# Patient Record
Sex: FEMALE | Race: WHITE | NOT HISPANIC OR LATINO | URBAN - METROPOLITAN AREA
[De-identification: names, ages, dates, MRNs, and addresses within clinical notes are randomized per-mention and may not be internally consistent; named-entity substitution may affect disease eponyms.]

---

## 2017-08-04 PROBLEM — M51.369 DDD (DEGENERATIVE DISC DISEASE), LUMBAR: Status: ACTIVE | Noted: 2017-08-04

## 2023-04-06 ENCOUNTER — APPOINTMENT (OUTPATIENT)
Dept: MRI IMAGING | Facility: HOSPITAL | Age: 58
End: 2023-04-06
Payer: COMMERCIAL

## 2023-04-06 ENCOUNTER — HOSPITAL ENCOUNTER (OUTPATIENT)
Facility: HOSPITAL | Age: 58
Setting detail: OBSERVATION
Discharge: HOME | End: 2023-04-07
Attending: EMERGENCY MEDICINE | Admitting: HOSPITALIST
Payer: COMMERCIAL

## 2023-04-06 ENCOUNTER — APPOINTMENT (OUTPATIENT)
Dept: CT IMAGING | Facility: HOSPITAL | Age: 58
End: 2023-04-06
Payer: COMMERCIAL

## 2023-04-06 DIAGNOSIS — R10.13 EPIGASTRIC PAIN: Primary | ICD-10-CM

## 2023-04-06 DIAGNOSIS — K83.8 COMMON BILE DUCT DILATION: ICD-10-CM

## 2023-04-06 LAB
ALBUMIN SERPL-MCNC: 4.3 G/DL (ref 3.5–5.2)
ALBUMIN/GLOB SERPL: 1.6 G/DL
ALP SERPL-CCNC: 88 U/L (ref 39–117)
ALT SERPL W P-5'-P-CCNC: 9 U/L (ref 1–33)
ANION GAP SERPL CALCULATED.3IONS-SCNC: 12.1 MMOL/L (ref 5–15)
AST SERPL-CCNC: 23 U/L (ref 1–32)
BASOPHILS # BLD AUTO: 0.05 10*3/MM3 (ref 0–0.2)
BASOPHILS NFR BLD AUTO: 0.4 % (ref 0–1.5)
BILIRUB SERPL-MCNC: 0.4 MG/DL (ref 0–1.2)
BILIRUB UR QL STRIP: NEGATIVE
BUN SERPL-MCNC: 16 MG/DL (ref 6–20)
BUN/CREAT SERPL: 21.3 (ref 7–25)
CALCIUM SPEC-SCNC: 9.4 MG/DL (ref 8.6–10.5)
CHLORIDE SERPL-SCNC: 105 MMOL/L (ref 98–107)
CLARITY UR: CLEAR
CO2 SERPL-SCNC: 23.9 MMOL/L (ref 22–29)
COLOR UR: YELLOW
CREAT SERPL-MCNC: 0.75 MG/DL (ref 0.57–1)
DEPRECATED RDW RBC AUTO: 41.3 FL (ref 37–54)
EGFRCR SERPLBLD CKD-EPI 2021: 91.8 ML/MIN/1.73
EOSINOPHIL # BLD AUTO: 0.43 10*3/MM3 (ref 0–0.4)
EOSINOPHIL NFR BLD AUTO: 3.6 % (ref 0.3–6.2)
ERYTHROCYTE [DISTWIDTH] IN BLOOD BY AUTOMATED COUNT: 12.5 % (ref 12.3–15.4)
GEN 5 2HR TROPONIN T REFLEX: <6 NG/L
GLOBULIN UR ELPH-MCNC: 2.7 GM/DL
GLUCOSE SERPL-MCNC: 93 MG/DL (ref 65–99)
GLUCOSE UR STRIP-MCNC: NEGATIVE MG/DL
HCT VFR BLD AUTO: 40.9 % (ref 34–46.6)
HGB BLD-MCNC: 13.9 G/DL (ref 12–15.9)
HGB UR QL STRIP.AUTO: NEGATIVE
HOLD SPECIMEN: NORMAL
HOLD SPECIMEN: NORMAL
IMM GRANULOCYTES # BLD AUTO: 0.04 10*3/MM3 (ref 0–0.05)
IMM GRANULOCYTES NFR BLD AUTO: 0.3 % (ref 0–0.5)
KETONES UR QL STRIP: NEGATIVE
LEUKOCYTE ESTERASE UR QL STRIP.AUTO: NEGATIVE
LIPASE SERPL-CCNC: 49 U/L (ref 13–60)
LYMPHOCYTES # BLD AUTO: 1.77 10*3/MM3 (ref 0.7–3.1)
LYMPHOCYTES NFR BLD AUTO: 15 % (ref 19.6–45.3)
MCH RBC QN AUTO: 30.5 PG (ref 26.6–33)
MCHC RBC AUTO-ENTMCNC: 34 G/DL (ref 31.5–35.7)
MCV RBC AUTO: 89.9 FL (ref 79–97)
MONOCYTES # BLD AUTO: 0.83 10*3/MM3 (ref 0.1–0.9)
MONOCYTES NFR BLD AUTO: 7 % (ref 5–12)
NEUTROPHILS NFR BLD AUTO: 73.7 % (ref 42.7–76)
NEUTROPHILS NFR BLD AUTO: 8.67 10*3/MM3 (ref 1.7–7)
NITRITE UR QL STRIP: NEGATIVE
NRBC BLD AUTO-RTO: 0 /100 WBC (ref 0–0.2)
PH UR STRIP.AUTO: <=5 [PH] (ref 5–8)
PLATELET # BLD AUTO: 344 10*3/MM3 (ref 140–450)
PMV BLD AUTO: 9.8 FL (ref 6–12)
POTASSIUM SERPL-SCNC: 5 MMOL/L (ref 3.5–5.2)
PROT SERPL-MCNC: 7 G/DL (ref 6–8.5)
PROT UR QL STRIP: NEGATIVE
QT INTERVAL: 407 MS
RBC # BLD AUTO: 4.55 10*6/MM3 (ref 3.77–5.28)
SODIUM SERPL-SCNC: 141 MMOL/L (ref 136–145)
SP GR UR STRIP: 1.02 (ref 1–1.03)
TROPONIN T DELTA: NORMAL
TROPONIN T SERPL HS-MCNC: 14 NG/L
UROBILINOGEN UR QL STRIP: NORMAL
WBC NRBC COR # BLD: 11.79 10*3/MM3 (ref 3.4–10.8)
WHOLE BLOOD HOLD COAG: NORMAL
WHOLE BLOOD HOLD SPECIMEN: NORMAL

## 2023-04-06 PROCEDURE — 36415 COLL VENOUS BLD VENIPUNCTURE: CPT

## 2023-04-06 PROCEDURE — 81003 URINALYSIS AUTO W/O SCOPE: CPT | Performed by: NURSE PRACTITIONER

## 2023-04-06 PROCEDURE — G0378 HOSPITAL OBSERVATION PER HR: HCPCS

## 2023-04-06 PROCEDURE — 84484 ASSAY OF TROPONIN QUANT: CPT | Performed by: NURSE PRACTITIONER

## 2023-04-06 PROCEDURE — 93005 ELECTROCARDIOGRAM TRACING: CPT | Performed by: NURSE PRACTITIONER

## 2023-04-06 PROCEDURE — 80053 COMPREHEN METABOLIC PANEL: CPT | Performed by: NURSE PRACTITIONER

## 2023-04-06 PROCEDURE — 25510000001 IOPAMIDOL 61 % SOLUTION: Performed by: EMERGENCY MEDICINE

## 2023-04-06 PROCEDURE — 85025 COMPLETE CBC W/AUTO DIFF WBC: CPT | Performed by: NURSE PRACTITIONER

## 2023-04-06 PROCEDURE — A9577 INJ MULTIHANCE: HCPCS | Performed by: INTERNAL MEDICINE

## 2023-04-06 PROCEDURE — 96374 THER/PROPH/DIAG INJ IV PUSH: CPT

## 2023-04-06 PROCEDURE — 74177 CT ABD & PELVIS W/CONTRAST: CPT

## 2023-04-06 PROCEDURE — 83690 ASSAY OF LIPASE: CPT | Performed by: NURSE PRACTITIONER

## 2023-04-06 PROCEDURE — 93010 ELECTROCARDIOGRAM REPORT: CPT | Performed by: INTERNAL MEDICINE

## 2023-04-06 PROCEDURE — 74183 MRI ABD W/O CNTR FLWD CNTR: CPT

## 2023-04-06 PROCEDURE — 96375 TX/PRO/DX INJ NEW DRUG ADDON: CPT

## 2023-04-06 PROCEDURE — 0 GADOBENATE DIMEGLUMINE 529 MG/ML SOLUTION: Performed by: INTERNAL MEDICINE

## 2023-04-06 PROCEDURE — 25010000002 ONDANSETRON PER 1 MG: Performed by: NURSE PRACTITIONER

## 2023-04-06 PROCEDURE — 99285 EMERGENCY DEPT VISIT HI MDM: CPT

## 2023-04-06 PROCEDURE — 96361 HYDRATE IV INFUSION ADD-ON: CPT

## 2023-04-06 PROCEDURE — 25010000002 MORPHINE PER 10 MG: Performed by: NURSE PRACTITIONER

## 2023-04-06 PROCEDURE — 25010000002 HYDROMORPHONE 1 MG/ML SOLUTION: Performed by: NURSE PRACTITIONER

## 2023-04-06 RX ORDER — UREA 10 %
3 LOTION (ML) TOPICAL NIGHTLY PRN
Status: DISCONTINUED | OUTPATIENT
Start: 2023-04-06 | End: 2023-04-07 | Stop reason: HOSPADM

## 2023-04-06 RX ORDER — ONDANSETRON 2 MG/ML
4 INJECTION INTRAMUSCULAR; INTRAVENOUS EVERY 6 HOURS PRN
Status: DISCONTINUED | OUTPATIENT
Start: 2023-04-06 | End: 2023-04-07 | Stop reason: HOSPADM

## 2023-04-06 RX ORDER — MORPHINE SULFATE 2 MG/ML
4 INJECTION, SOLUTION INTRAMUSCULAR; INTRAVENOUS ONCE
Status: DISCONTINUED | OUTPATIENT
Start: 2023-04-06 | End: 2023-04-06

## 2023-04-06 RX ORDER — SODIUM CHLORIDE 9 MG/ML
75 INJECTION, SOLUTION INTRAVENOUS CONTINUOUS
Status: DISCONTINUED | OUTPATIENT
Start: 2023-04-06 | End: 2023-04-07 | Stop reason: HOSPADM

## 2023-04-06 RX ORDER — CHOLECALCIFEROL (VITAMIN D3) 125 MCG
1000 CAPSULE ORAL DAILY
Status: DISCONTINUED | OUTPATIENT
Start: 2023-04-07 | End: 2023-04-07 | Stop reason: HOSPADM

## 2023-04-06 RX ORDER — PANTOPRAZOLE SODIUM 40 MG/10ML
80 INJECTION, POWDER, LYOPHILIZED, FOR SOLUTION INTRAVENOUS ONCE
Status: COMPLETED | OUTPATIENT
Start: 2023-04-06 | End: 2023-04-06

## 2023-04-06 RX ORDER — ACETAMINOPHEN 325 MG/1
650 TABLET ORAL EVERY 4 HOURS PRN
Status: DISCONTINUED | OUTPATIENT
Start: 2023-04-06 | End: 2023-04-07 | Stop reason: HOSPADM

## 2023-04-06 RX ORDER — MORPHINE SULFATE 2 MG/ML
4 INJECTION, SOLUTION INTRAMUSCULAR; INTRAVENOUS ONCE
Status: COMPLETED | OUTPATIENT
Start: 2023-04-06 | End: 2023-04-06

## 2023-04-06 RX ORDER — ONDANSETRON 4 MG/1
4 TABLET, FILM COATED ORAL EVERY 6 HOURS PRN
Status: DISCONTINUED | OUTPATIENT
Start: 2023-04-06 | End: 2023-04-07 | Stop reason: HOSPADM

## 2023-04-06 RX ORDER — MELATONIN
2000 DAILY
Status: DISCONTINUED | OUTPATIENT
Start: 2023-04-07 | End: 2023-04-07 | Stop reason: HOSPADM

## 2023-04-06 RX ORDER — SODIUM CHLORIDE 0.9 % (FLUSH) 0.9 %
10 SYRINGE (ML) INJECTION AS NEEDED
Status: DISCONTINUED | OUTPATIENT
Start: 2023-04-06 | End: 2023-04-07 | Stop reason: HOSPADM

## 2023-04-06 RX ORDER — MORPHINE SULFATE 2 MG/ML
4 INJECTION, SOLUTION INTRAMUSCULAR; INTRAVENOUS EVERY 4 HOURS PRN
Status: DISCONTINUED | OUTPATIENT
Start: 2023-04-06 | End: 2023-04-07 | Stop reason: HOSPADM

## 2023-04-06 RX ORDER — ONDANSETRON 2 MG/ML
4 INJECTION INTRAMUSCULAR; INTRAVENOUS ONCE
Status: COMPLETED | OUTPATIENT
Start: 2023-04-06 | End: 2023-04-06

## 2023-04-06 RX ADMIN — ACETAMINOPHEN 650 MG: 325 TABLET ORAL at 23:23

## 2023-04-06 RX ADMIN — MORPHINE SULFATE 4 MG: 2 INJECTION, SOLUTION INTRAMUSCULAR; INTRAVENOUS at 12:56

## 2023-04-06 RX ADMIN — PANTOPRAZOLE SODIUM 80 MG: 40 INJECTION, POWDER, FOR SOLUTION INTRAVENOUS at 12:56

## 2023-04-06 RX ADMIN — ONDANSETRON 4 MG: 2 INJECTION INTRAMUSCULAR; INTRAVENOUS at 12:55

## 2023-04-06 RX ADMIN — IOPAMIDOL 85 ML: 612 INJECTION, SOLUTION INTRAVENOUS at 15:38

## 2023-04-06 RX ADMIN — SODIUM CHLORIDE 75 ML/HR: 9 INJECTION, SOLUTION INTRAVENOUS at 21:18

## 2023-04-06 RX ADMIN — HYDROMORPHONE HYDROCHLORIDE 1 MG: 1 INJECTION, SOLUTION INTRAMUSCULAR; INTRAVENOUS; SUBCUTANEOUS at 16:32

## 2023-04-06 RX ADMIN — GADOBENATE DIMEGLUMINE 16 ML: 529 INJECTION, SOLUTION INTRAVENOUS at 22:46

## 2023-04-06 NOTE — ED NOTES
"Nursing report ED to floor  Hoda Hayes  57 y.o.  female    HPI :   Chief Complaint   Patient presents with    Abdominal Pain       Admitting doctor:   Kathleen Freitas MD    Admitting diagnosis:   The primary encounter diagnosis was Epigastric pain. A diagnosis of Common bile duct dilation was also pertinent to this visit.    Code status:   Current Code Status       Date Active Code Status Order ID Comments User Context       Not on file            Allergies:   Patient has no known allergies.    Isolation:   No active isolations    Intake and Output  No intake or output data in the 24 hours ending 04/06/23 1641    Weight:       04/06/23  1245   Weight: 71.7 kg (158 lb)       Most recent vitals:   Vitals:    04/06/23 1233 04/06/23 1234 04/06/23 1244 04/06/23 1245   BP: 120/82      Pulse:   87    Resp:       Temp:       TempSrc:       SpO2:   100%    Weight:    71.7 kg (158 lb)   Height:  170.2 cm (67.01\")         Active LDAs/IV Access:   Lines, Drains & Airways       Active LDAs       Name Placement date Placement time Site Days    Peripheral IV 04/06/23 1247 Right Antecubital 04/06/23  1247  Antecubital  less than 1                    Labs (abnormal labs have a star):   Labs Reviewed   CBC WITH AUTO DIFFERENTIAL - Abnormal; Notable for the following components:       Result Value    WBC 11.79 (*)     Lymphocyte % 15.0 (*)     Neutrophils, Absolute 8.67 (*)     Eosinophils, Absolute 0.43 (*)     All other components within normal limits   TROPONIN - Abnormal; Notable for the following components:    HS Troponin T 14 (*)     All other components within normal limits    Narrative:     High Sensitive Troponin T Reference Range:  <10.0 ng/L- Negative Female for AMI  <15.0 ng/L- Negative Male for AMI  >=10 - Abnormal Female indicating possible myocardial injury.  >=15 - Abnormal Male indicating possible myocardial injury.   Clinicians would have to utilize clinical acumen, EKG, Troponin, and serial changes " to determine if it is an Acute Myocardial Infarction or myocardial injury due to an underlying chronic condition.        LIPASE - Normal   URINALYSIS W/ MICROSCOPIC IF INDICATED (NO CULTURE) - Normal    Narrative:     Urine microscopic not indicated.   RAINBOW DRAW    Narrative:     The following orders were created for panel order [place] Draw.  Procedure                               Abnormality         Status                     ---------                               -----------         ------                     Green Top (Gel)[[id number]]                                   Final result               Lavender Top[[id number]]                                      Final result               Gold Top - SST[[id number]]                                    Final result               Light Blue Top[[id number]]                                   Final result                 Please view results for these tests on the individual orders.   COMPREHENSIVE METABOLIC PANEL    Narrative:     GFR Normal >60  Chronic Kidney Disease <60  Kidney Failure <15     HIGH SENSITIVITIY TROPONIN T 2HR    Narrative:     High Sensitive Troponin T Reference Range:  <10.0 ng/L- Negative Female for AMI  <15.0 ng/L- Negative Male for AMI  >=10 - Abnormal Female indicating possible myocardial injury.  >=15 - Abnormal Male indicating possible myocardial injury.   Clinicians would have to utilize clinical acumen, EKG, Troponin, and serial changes to determine if it is an Acute Myocardial Infarction or myocardial injury due to an underlying chronic condition.        CBC AND DIFFERENTIAL    Narrative:     The following orders were created for panel order CBC & Differential.  Procedure                               Abnormality         Status                     ---------                               -----------         ------                     CBC Auto Differential[[id number]]        Abnormal            Final result                 Please view results for  these tests on the individual orders.   GREEN TOP   LAVENDER TOP   GOLD TOP - SST   LIGHT BLUE TOP       EKG:   ECG 12 Lead Chest Pain   Final Result   HEART RATE= 78  bpm   RR Interval= 769  ms   AZ Interval= 143  ms   P Horizontal Axis= 10  deg   P Front Axis= 60  deg   QRSD Interval= 98  ms   QT Interval= 407  ms   QRS Axis= 25  deg   T Wave Axis= 42  deg   - BORDERLINE ECG -   Sinus rhythm   Borderline ST elevation, anterior leads   No Prior Tracing for Comparison   Electronically Signed By: Germaine Hunter ([facility]) 2023 16:00:09   Date and Time of Study: 2023 12:57:33          Meds given in ED:   Medications   sodium chloride 0.9 % flush 10 mL (has no administration in time range)   pantoprazole (PROTONIX) injection 80 mg (80 mg Intravenous Given 23 1256)   morphine injection 4 mg (4 mg Intravenous Given 23 1256)   ondansetron (ZOFRAN) injection 4 mg (4 mg Intravenous Given 23 1255)   iopamidol (ISOVUE-300) 61 % injection 85 mL (85 mL Intravenous Given 23 1538)   HYDROmorphone (DILAUDID) injection 1 mg (1 mg Intravenous Given 23 1632)       Imaging results:  CT Abdomen Pelvis With Contrast    Result Date: 2023   1.  Status post cholecystectomy with central intrahepatic biliary ductal prominence and common bile duct dilatation, which tapers distally and is likely postoperative. However, the pancreatic duct is also prominent. Further evaluation contrast-enhanced MRCP is recommended to exclude an underlying lesion. 2.  Colonic diverticulosis.  Discussed with NAFISA Thompson at 4:06 PM.  This report was finalized on 2023 4:06 PM by Dr. Darlene Bains M.D.       Ambulatory status:   Partial assist    Social issues:   Social History     Socioeconomic History    Marital status:    Tobacco Use    Smoking status: Former     Packs/day: 0.50     Years: 12.00     Pack years: 6.00     Types: Cigarettes     Quit date: 10/21/2016     Years since quittin.4    Smokeless  tobacco: Never   Substance and Sexual Activity    Alcohol use: Yes     Comment: OCCASIONAL    Drug use: No    Sexual activity: Yes     Partners: Male       NIH Stroke Scale:         Sweetie Campa RN  04/06/23 16:41 EDT

## 2023-04-06 NOTE — PLAN OF CARE
Goal Outcome Evaluation:              Outcome Evaluation: admit from ER. GI consulted. Admission orders placed.

## 2023-04-06 NOTE — ED PROVIDER NOTES
MD ATTESTATION NOTE    The PEEWEE and I have discussed this patient's history, physical exam, and treatment plan.  I have reviewed the documentation and personally had a face to face interaction with the patient. I affirm the documentation and agree with the treatment and plan.  The attached note describes my personal findings.    I provided a substantive portion of the care of this patient. I personally performed the physical exam, in its entirety.    History  57-year-old female presents with epigastric pain onset this morning.    Physical Exam  Vital Signs reviewed  GENERAL: Alert female in mild distress.  Triage vitals reviewed and are fairly benign  HENT: nares patent  EYES: no scleral icterus  CV: regular rhythm, regular rate-no murmur  RESPIRATORY: normal effort, clear to auscultation bilaterally  ABDOMEN: soft, mild epigastric tenderness without rebound or guarding  MUSCULOSKELETAL: no deformity  NEURO: Strength sensation and coordination are grossly intact.  Speech and mentation are unremarkable  SKIN: warm, dry    EKG independently interpreted by me    Time 12:57 PM  Sinus 78  P waves-normal P waves and NV intervals  QRS-normal axis, normal QRS  ST, T wave-unremarkable    No prior to compare      Disposition  I discussed treatment and evaluation this patient with NP Giselle Jaquez.  Patient with epigastric tenderness of unclear etiology.  EKG reassuring and sounds atypical for cardiac.  Suspect this may be more of a gastric problem, gastritis favored is likely cause.  Serum lipase is normal which would go against pancreatitis.  Gallbladder is already surgically absent.  We will get CT scan of the abdomen for further assessment.  Disposition pending results of further testing.       Jose Lombardi MD  04/06/23 1076

## 2023-04-06 NOTE — ED PROVIDER NOTES
EMERGENCY DEPARTMENT ENCOUNTER    Room Number:    Date seen:  2023  PCP: Provider, No Known  Historian: patient      HPI:  Chief Complaint: abdominal pain  A complete HPI/ROS/PMH/PSH/SH/FH are unobtainable due to: nothing  Context: Hoda Hayes is a pleasant afebrile ambulatory 57 y.o.  female who presents to the ED c/o epigastric abdominal pain      She describes it as epigastric.  States it is waxing and waning in nature.  Nothing improves it nothing worsens it.  States she has never had this pain previously.  She denies daily alcohol use.  She denies any melena.      PAST MEDICAL HISTORY  Active Ambulatory Problems     Diagnosis Date Noted   • DDD (degenerative disc disease), lumbar 2017     Resolved Ambulatory Problems     Diagnosis Date Noted   • No Resolved Ambulatory Problems     Past Medical History:   Diagnosis Date   • Abdominal pain    • Bulging lumbar disc    • Dizziness of unknown cause    • History of anemia    • Low back pain    • Migraine    • Neck pain          PAST SURGICAL HISTORY  Past Surgical History:   Procedure Laterality Date   • BREAST SURGERY      AUGMENTATION   • CHOLECYSTECTOMY N/A 10/21/2016    Procedure: CHOLECYSTECTOMY LAPAROSCOPIC;  Surgeon: Luis Topete MD;  Location: [facility];  Service:    • TONSILLECTOMY           FAMILY HISTORY  Family History   Problem Relation Age of Onset   • No Known Problems Mother    • No Known Problems Father          SOCIAL HISTORY  Social History     Socioeconomic History   • Marital status:    Tobacco Use   • Smoking status: Former     Packs/day: 0.50     Years: 12.00     Pack years: 6.00     Types: Cigarettes     Quit date: 10/21/2016     Years since quittin.4   • Smokeless tobacco: Never   Substance and Sexual Activity   • Alcohol use: Yes     Comment: OCCASIONAL   • Drug use: No   • Sexual activity: Yes     Partners: Male         ALLERGIES  Patient has no known allergies.        REVIEW OF  SYSTEMS  Review of Systems   Gastrointestinal: Positive for abdominal pain.   All other systems reviewed and are negative.           PHYSICAL EXAM  ED Triage Vitals   Temp Heart Rate Resp BP SpO2   04/06/23 1222 04/06/23 1244 04/06/23 1222 04/06/23 1233 04/06/23 1244   98 °F (36.7 °C) 87 18 120/82 100 %      Temp src Heart Rate Source Patient Position BP Location FiO2 (%)   04/06/23 1222 -- -- -- --   Tympanic           Physical Exam      GENERAL: Alert & oriented x 4, no acute distress  HENT: nares patent, mucus membranes dry and intact  EYES: no scleral icterus, no injection  CV: regular rhythm, normal rate, no murmurs or gallops, no peripheral edema  RESPIRATORY: normal effort, clear to all fields bilaterally, able to speak in full sentences without hint of distress  ABDOMEN: soft and nontender diffusely, bowel sounds WNL, no rebound or guarding  MUSCULOSKELETAL: no deformity, normal active range of motion of all extremities   NEURO: alert, moves all extremities, follows commands  PSYCH:  calm, cooperative  SKIN: warm, dry and intact      Vital signs and nursing notes reviewed.          LAB RESULTS  Recent Results (from the past 24 hour(s))   Lipase    Collection Time: 04/06/23 12:38 PM    Specimen: Blood   Result Value Ref Range    Lipase 49 13 - 60 U/L   Urinalysis With Microscopic If Indicated (No Culture) - Urine, Clean Catch    Collection Time: 04/06/23 12:38 PM    Specimen: Urine, Clean Catch   Result Value Ref Range    Color, UA Yellow Yellow, Straw    Appearance, UA Clear Clear    pH, UA <=5.0 5.0 - 8.0    Specific Gravity, UA 1.022 1.005 - 1.030    Glucose, UA Negative Negative    Ketones, UA Negative Negative    Bilirubin, UA Negative Negative    Blood, UA Negative Negative    Protein, UA Negative Negative    Leuk Esterase, UA Negative Negative    Nitrite, UA Negative Negative    Urobilinogen, UA 0.2 E.U./dL 0.2 - 1.0 E.U./dL   Green Top (Gel)    Collection Time: 04/06/23 12:38 PM   Result Value Ref  Range    Extra Tube Hold for add-ons.    Lavender Top    Collection Time: 04/06/23 12:38 PM   Result Value Ref Range    Extra Tube hold for add-on    Gold Top - SST    Collection Time: 04/06/23 12:38 PM   Result Value Ref Range    Extra Tube Hold for add-ons.    Light Blue Top    Collection Time: 04/06/23 12:38 PM   Result Value Ref Range    Extra Tube Hold for add-ons.    CBC Auto Differential    Collection Time: 04/06/23 12:38 PM    Specimen: Blood   Result Value Ref Range    WBC 11.79 (H) 3.40 - 10.80 10*3/mm3    RBC 4.55 3.77 - 5.28 10*6/mm3    Hemoglobin 13.9 12.0 - 15.9 g/dL    Hematocrit 40.9 34.0 - 46.6 %    MCV 89.9 79.0 - 97.0 fL    MCH 30.5 26.6 - 33.0 pg    MCHC 34.0 31.5 - 35.7 g/dL    RDW 12.5 12.3 - 15.4 %    RDW-SD 41.3 37.0 - 54.0 fl    MPV 9.8 6.0 - 12.0 fL    Platelets 344 140 - 450 10*3/mm3    Neutrophil % 73.7 42.7 - 76.0 %    Lymphocyte % 15.0 (L) 19.6 - 45.3 %    Monocyte % 7.0 5.0 - 12.0 %    Eosinophil % 3.6 0.3 - 6.2 %    Basophil % 0.4 0.0 - 1.5 %    Immature Grans % 0.3 0.0 - 0.5 %    Neutrophils, Absolute 8.67 (H) 1.70 - 7.00 10*3/mm3    Lymphocytes, Absolute 1.77 0.70 - 3.10 10*3/mm3    Monocytes, Absolute 0.83 0.10 - 0.90 10*3/mm3    Eosinophils, Absolute 0.43 (H) 0.00 - 0.40 10*3/mm3    Basophils, Absolute 0.05 0.00 - 0.20 10*3/mm3    Immature Grans, Absolute 0.04 0.00 - 0.05 10*3/mm3    nRBC 0.0 0.0 - 0.2 /100 WBC   ECG 12 Lead Chest Pain    Collection Time: 04/06/23 12:57 PM   Result Value Ref Range    QT Interval 407 ms   Comprehensive Metabolic Panel    Collection Time: 04/06/23  1:25 PM    Specimen: Blood   Result Value Ref Range    Glucose 93 65 - 99 mg/dL    BUN 16 6 - 20 mg/dL    Creatinine 0.75 0.57 - 1.00 mg/dL    Sodium 141 136 - 145 mmol/L    Potassium 5.0 3.5 - 5.2 mmol/L    Chloride 105 98 - 107 mmol/L    CO2 23.9 22.0 - 29.0 mmol/L    Calcium 9.4 8.6 - 10.5 mg/dL    Total Protein 7.0 6.0 - 8.5 g/dL    Albumin 4.3 3.5 - 5.2 g/dL    ALT (SGPT) 9 1 - 33 U/L    AST (SGOT)  23 1 - 32 U/L    Alkaline Phosphatase 88 39 - 117 U/L    Total Bilirubin 0.4 0.0 - 1.2 mg/dL    Globulin 2.7 gm/dL    A/G Ratio 1.6 g/dL    BUN/Creatinine Ratio 21.3 7.0 - 25.0    Anion Gap 12.1 5.0 - 15.0 mmol/L    eGFR 91.8 >60.0 mL/min/1.73   High Sensitivity Troponin T    Collection Time: 04/06/23  1:25 PM    Specimen: Blood   Result Value Ref Range    HS Troponin T 14 (H) <10 ng/L   High Sensitivity Troponin T 2Hr    Collection Time: 04/06/23  3:53 PM    Specimen: Arm, Right; Blood   Result Value Ref Range    HS Troponin T <6 <10 ng/L    Troponin T Delta         Ordered the above labs and reviewed the results.        RADIOLOGY  CT Abdomen Pelvis With Contrast    Result Date: 4/6/2023  CT ABDOMEN PELVIS W CONTRAST-  HISTORY:  Epigastric pain, taking NSAIDs.  TECHNIQUE:  CT of the abdomen and pelvis was performed following the administration of intravenous contrast.   Radiation dose reduction techniques were utilized, including automated exposure control and exposure modulation based on body size.  COMPARISON:  Gallbladder ultrasound 10/12/2016  FINDINGS:  Heart size is normal. There is no pericardial effusion. Lung bases and pleural spaces are clear. The liver is normal in size. A hypodense focus in the peripheral right hepatic lobe is too small to accurately characterize. There is central intrahepatic biliary ductal prominence.. The gallbladder is surgically absent. The common bile duct is dilated and slightly tapers distally. The spleen is normal in size. The proximal pancreatic duct is prominent. The adrenal glands are within normal limits. There is a 3.9 cm inferior right renal cyst. There is no hydronephrosis. No pathologically enlarged abdominal or pelvic lymph nodes are identified. The abdominal aorta is normal in caliber. There is no bowel obstruction. The appendix is within normal limits. There is colonic diverticulosis without CT evidence of acute diverticulitis. The bladder is mildly distended normal  limits. The uterus is present. There is no adnexal mass. There is a tiny fat-containing umbilical hernia. There are partially imaged bilateral breast implants. There is multilevel degenerative disc disease.        1.  Status post cholecystectomy with central intrahepatic biliary ductal prominence and common bile duct dilatation, which tapers distally and is likely postoperative. However, the pancreatic duct is also prominent. Further evaluation contrast-enhanced MRCP is recommended to exclude an underlying lesion. 2.  Colonic diverticulosis.  Discussed with NAFISA Thompson at 4:06 PM.  This report was finalized on 4/6/2023 4:06 PM by Dr. Darlene Bains M.D.        Ordered the above noted radiological studies. Reviewed by me in PACS.            PROCEDURES  Procedures          MEDICATIONS GIVEN IN ER  Medications   sodium chloride 0.9 % flush 10 mL (has no administration in time range)   pantoprazole (PROTONIX) injection 80 mg (80 mg Intravenous Given 4/6/23 1256)   morphine injection 4 mg (4 mg Intravenous Given 4/6/23 1256)   ondansetron (ZOFRAN) injection 4 mg (4 mg Intravenous Given 4/6/23 1255)                   MEDICAL DECISION MAKING, PROGRESS, and CONSULTS    All labs have been independently reviewed by me.  All radiology studies have been reviewed by me and I have also reviewed the radiology report.   EKG's independently viewed and interpreted by me.  Discussion below represents my analysis of pertinent findings related to patient's condition, differential diagnosis, treatment plan and final disposition.      Additional sources:  - Discussed/ obtained information from independent historians: Obtained from patient    - External (non-ED) record review: 11/30/2022 [facility], seen for right ear fullness by NAFISA Brito, prescribed Augmentin and methylprednisolone for rhinosinusitis    - Chronic or social conditions impacting care: None    - Shared decision making: Risk benefit of  CT abdomen pelvis discussed with patient and she is agreeable to proceed with advanced imaging despite radiation exposure      Orders placed during this visit:  Orders Placed This Encounter   Procedures   • CT Abdomen Pelvis With Contrast   • [place] Draw   • Comprehensive Metabolic Panel   • Lipase   • Urinalysis With Microscopic If Indicated (No Culture) - Urine, Clean Catch   • CBC Auto Differential   • High Sensitivity Troponin T   • ECG 12 Lead Chest Pain   • Insert Peripheral IV   • CBC & Differential   • Green Top (Gel)   • Lavender Top   • Gold Top - SST   • Light Blue Top         Additional orders considered but not ordered:  MRI considered but I feel this would be better done by inpatient team        Differential diagnosis includes but is not limited to:    Pancreatitis, duodenitis, gastric ulcer      Independent interpretation of labs, radiology studies, and discussions with consultants:  ED Course as of 04/06/23 1633   Thu Apr 06, 2023   1248 Pt c/o epigastric pain today. States she drank a few cups of coffee on an empty stomach like she does regularly ,has been taking claritin and advil as well lately usually on an empty stomach. No melena, last BM was today and normal. States she developed nausea and cramping today. States she had an endoscopy in [place] years ago but unsure what this showed. Had her cholecystectomy about 4 years ago [AH]   1408 WBC(!): 11.79 [AH]   1459 Hemoglobin: 13.9 [AH]   1459 Lipase: 49 [AH]   1543 HS Troponin T(!): 14 [AH]   1612 Phone call with [facility].  Discussed the patient, relevant history, exam, diagnostics, ED findings/progress, and concerns.  MD advises that patient has common bile duct dilatation and would likely benefit from an MRCP   [AH]   1632 Phone call with dr. jalloh.  Discussed the patient, relevant history, exam, diagnostics, ED findings/progress, and concerns.  []      ED Course User Index  [AH] Giselle Jaquez, APRN             I have worn  appropriate PPE during this patient encounter, sanitized my hands both with entering and exiting patient's room.      DIAGNOSIS  Final diagnoses:   Epigastric pain   Common bile duct dilation         DISPOSITION  Admitted to [facility] dr. jalloh            Latest Documented Vital Signs:  As of 14:59 EDT  BP- 120/82 HR- 87 Temp- 98 °F (36.7 °C) (Tympanic) O2 sat- 100%              --    Please note that portions of this were completed with a voice recognition program.       Note Disclaimer: At [facility], we believe that sharing information builds trust and better relationships. You are receiving this note because you are receiving care at [facility] or recently visited. It is possible you will see health information before a provider has talked with you about it. This kind of information can be easy to misunderstand. To help you fully understand what it means for your health, we urge you to discuss this note with your provider.           Giselle Jaquez, NAFISA  04/06/23 9960

## 2023-04-07 VITALS
OXYGEN SATURATION: 97 % | WEIGHT: 178.79 LBS | DIASTOLIC BLOOD PRESSURE: 64 MMHG | BODY MASS INDEX: 28.06 KG/M2 | HEIGHT: 67 IN | TEMPERATURE: 98.4 F | HEART RATE: 68 BPM | RESPIRATION RATE: 16 BRPM | SYSTOLIC BLOOD PRESSURE: 100 MMHG

## 2023-04-07 LAB
ANION GAP SERPL CALCULATED.3IONS-SCNC: 5.7 MMOL/L (ref 5–15)
BUN SERPL-MCNC: 9 MG/DL (ref 6–20)
BUN/CREAT SERPL: 14.5 (ref 7–25)
CALCIUM SPEC-SCNC: 9.3 MG/DL (ref 8.6–10.5)
CHLORIDE SERPL-SCNC: 106 MMOL/L (ref 98–107)
CO2 SERPL-SCNC: 29.3 MMOL/L (ref 22–29)
CREAT SERPL-MCNC: 0.62 MG/DL (ref 0.57–1)
DEPRECATED RDW RBC AUTO: 39.6 FL (ref 37–54)
EGFRCR SERPLBLD CKD-EPI 2021: 102.7 ML/MIN/1.73
ERYTHROCYTE [DISTWIDTH] IN BLOOD BY AUTOMATED COUNT: 12.1 % (ref 12.3–15.4)
GLUCOSE SERPL-MCNC: 102 MG/DL (ref 65–99)
HCT VFR BLD AUTO: 36.8 % (ref 34–46.6)
HGB BLD-MCNC: 11.8 G/DL (ref 12–15.9)
MCH RBC QN AUTO: 28.7 PG (ref 26.6–33)
MCHC RBC AUTO-ENTMCNC: 32.1 G/DL (ref 31.5–35.7)
MCV RBC AUTO: 89.5 FL (ref 79–97)
PLATELET # BLD AUTO: 287 10*3/MM3 (ref 140–450)
PMV BLD AUTO: 9.2 FL (ref 6–12)
POTASSIUM SERPL-SCNC: 4.2 MMOL/L (ref 3.5–5.2)
RBC # BLD AUTO: 4.11 10*6/MM3 (ref 3.77–5.28)
SODIUM SERPL-SCNC: 141 MMOL/L (ref 136–145)
WBC NRBC COR # BLD: 5.72 10*3/MM3 (ref 3.4–10.8)

## 2023-04-07 PROCEDURE — 80048 BASIC METABOLIC PNL TOTAL CA: CPT | Performed by: INTERNAL MEDICINE

## 2023-04-07 PROCEDURE — 99254 IP/OBS CNSLTJ NEW/EST MOD 60: CPT | Performed by: INTERNAL MEDICINE

## 2023-04-07 PROCEDURE — 96361 HYDRATE IV INFUSION ADD-ON: CPT

## 2023-04-07 PROCEDURE — G0378 HOSPITAL OBSERVATION PER HR: HCPCS

## 2023-04-07 PROCEDURE — 85027 COMPLETE CBC AUTOMATED: CPT | Performed by: INTERNAL MEDICINE

## 2023-04-07 RX ORDER — PANTOPRAZOLE SODIUM 40 MG/1
40 TABLET, DELAYED RELEASE ORAL
Qty: 45 TABLET | Refills: 0 | Status: SHIPPED | OUTPATIENT
Start: 2023-04-08 | End: 2023-05-23

## 2023-04-07 RX ORDER — PANTOPRAZOLE SODIUM 40 MG/1
40 TABLET, DELAYED RELEASE ORAL
Status: DISCONTINUED | OUTPATIENT
Start: 2023-04-07 | End: 2023-04-07 | Stop reason: HOSPADM

## 2023-04-07 RX ADMIN — Medication 2000 UNITS: at 08:21

## 2023-04-07 RX ADMIN — Medication 1000 MCG: at 08:21

## 2023-04-07 RX ADMIN — ACETAMINOPHEN 650 MG: 325 TABLET ORAL at 08:21

## 2023-04-07 RX ADMIN — PANTOPRAZOLE SODIUM 40 MG: 40 TABLET, DELAYED RELEASE ORAL at 08:21

## 2023-04-07 RX ADMIN — SODIUM CHLORIDE 75 ML/HR: 9 INJECTION, SOLUTION INTRAVENOUS at 10:50

## 2023-04-07 NOTE — H&P
HISTORY AND PHYSICAL   [facility]        Date of Admission: 2023  Patient Identification:  Name: Hoda Hayes  Age: 57 y.o.  Sex: female  :  1965  MRN: [id number]                     Primary Care Physician: Provider, No Known    Chief Complaint:  57 year old female who presented to the emergency room with abdominal pain which started this morning; she denies fever or chills; she felt the same prior to having her gallbladder removed; she has had some nausea and vomiting    History of Present Illness:   As above    Past Medical History:  Past Medical History:   Diagnosis Date   • Abdominal pain    • Bulging lumbar disc    • Dizziness of unknown cause     LAST FEW MONTHS, CHANGE IN MEDICINE   • History of anemia     WITH PREGNANCY   • Low back pain    • Migraine    • Neck pain     CERVICAL, SEES CHIROPRACTOR     Past Surgical History:  Past Surgical History:   Procedure Laterality Date   • BREAST SURGERY      AUGMENTATION   • CHOLECYSTECTOMY N/A 10/21/2016    Procedure: CHOLECYSTECTOMY LAPAROSCOPIC;  Surgeon: Luis Topete MD;  Location: [facility] OR [facility];  Service:    • TONSILLECTOMY        Home Meds:  Medications Prior to Admission   Medication Sig Dispense Refill Last Dose   • Cholecalciferol (VITAMIN D3) 2000 UNITS tablet Take 1 tablet by mouth Daily.   2023   • Cyanocobalamin (B-12) 5000 MCG capsule Take 5,000 mcg by mouth Daily.   2023   • ibuprofen (ADVIL,MOTRIN) 200 MG tablet Take 1 tablet by mouth As Needed for Mild Pain.   2023   • Magnesium 250 MG tablet Take 1 tablet by mouth Daily.   2023   • VITAMIN E PO Take 800 Units by mouth Daily.   2023   • azithromycin (ZITHROMAX Z-GHAZAL) 250 MG tablet Take 2 tablets the first day, then 1 tablet daily for 4 days. 6 tablet 0    • Chlorcyclizine-Pseudoephed (STAHIST AD) 25-60 MG tablet 1 tablet 3 times a day as needed for congestion 30 tablet 0    • estradiol-norethindrone (ACTIVELLA) 1-0.5 MG per tablet Take 1  tablet by mouth Daily.      • Flaxseed, Linseed, (FLAX SEEDS PO) Take  by mouth Daily.      • guaiFENesin-codeine (GUAIFENESIN AC) 100-10 MG/5ML liquid 1-2 teaspoons PO BID (bedtime/naptime) as needed for cough 180 mL 0    • methylPREDNISolone (MEDROL, GHAZAL,) 4 MG tablet       • MULTIPLE VITAMIN PO Take  by mouth Daily.          Allergies:  No Known Allergies  Immunizations:  Immunization History   Administered Date(s) Administered   • COVID-19 (MODERNA) 1st, 2nd, 3rd Dose Only 2021, 2021   • Influenza Quad Vaccine (Inpatient) 10/30/2017   • Shingrix 2019     Social History:   Social History     Social History Narrative   • Not on file     Social History     Socioeconomic History   • Marital status:    Tobacco Use   • Smoking status: Former     Packs/day: 0.50     Years: 12.00     Pack years: 6.00     Types: Cigarettes     Quit date: 10/21/2016     Years since quittin.4   • Smokeless tobacco: Never   Vaping Use   • Vaping Use: Never used   Substance and Sexual Activity   • Alcohol use: Yes     Comment: OCCASIONAL   • Drug use: No   • Sexual activity: Yes     Partners: Male       Family History:  Family History   Problem Relation Age of Onset   • No Known Problems Mother    • No Known Problems Father         Review of Systems  See history of present illness and past medical history.  Patient denies headache, dizziness, syncope, falls, trauma, change in vision, change in hearing, change in taste, changes in weight, changes in appetite, focal weakness, numbness, or paresthesia.  Patient denies chest pain, palpitations, dyspnea, orthopnea, PND, cough, sinus pressure, rhinorrhea, epistaxis, hemoptysis, hematemesis, diarrhea, constipation or hematchezia.  Denies cold or heat intolerance, polydipsia, polyuria, polyphagia. Denies hematuria, pyuria, dysuria, hesitancy, frequency or urgency. Denies consumption of raw and under cooked meats foods or change in water source.  Denies fever, chills,  "sweats, night sweats.  Denies missing any routine medications. Remainder of ROS is negative.    Objective:  T Max 24 hrs: Temp (24hrs), Av °F (36.7 °C), Min:98 °F (36.7 °C), Max:98 °F (36.7 °C)    Vitals Ranges:   Temp:  [98 °F (36.7 °C)] 98 °F (36.7 °C)  Heart Rate:  [74-89] 82  Resp:  [18] 18  BP: (119-129)/(73-82) 129/77      Exam:  /77 (BP Location: Left arm, Patient Position: Lying)   Pulse 82   Temp 98 °F (36.7 °C) (Oral)   Resp 18   Ht 170.2 cm (67\")   Wt 81.3 kg (179 lb 3.7 oz)   SpO2 96%   BMI 28.07 kg/m²     General Appearance:    Alert, cooperative, no distress, appears stated age   Head:    Normocephalic, without obvious abnormality, atraumatic   Eyes:    PERRL, conjunctivae/corneas clear, EOM's intact, both eyes   Ears:    Normal external ear canals, both ears   Nose:   Nares normal, septum midline, mucosa normal, no drainage    or sinus tenderness   Throat:   Lips, mucosa, and tongue normal   Neck:   Supple, symmetrical, trachea midline, no adenopathy;     thyroid:  no enlargement/tenderness/nodules; no carotid    bruit or JVD   Back:     Symmetric, no curvature, ROM normal, no CVA tenderness   Lungs:     Decreased breath sounds bilaterally, respirations unlabored   Chest Wall:    No tenderness or deformity    Heart:    Regular rate and rhythm, S1 and S2 normal, no murmur, rub   or gallop   Abdomen:     Soft, nontender, bowel sounds active all four quadrants,     no masses, no hepatomegaly, no splenomegaly   Extremities:   Extremities normal, atraumatic, no cyanosis or edema   Pulses:   2+ and symmetric all extremities   Skin:   Skin color, texture, turgor normal, no rashes or lesions               .    Data Review:  Labs in chart were reviewed.  WBC   Date Value Ref Range Status   2023 11.79 (H) 3.40 - 10.80 10*3/mm3 Final     Hemoglobin   Date Value Ref Range Status   2023 13.9 12.0 - 15.9 g/dL Final     Hematocrit   Date Value Ref Range Status   2023 40.9 34.0 - " 46.6 % Final     Platelets   Date Value Ref Range Status   04/06/2023 344 140 - 450 10*3/mm3 Final     Sodium   Date Value Ref Range Status   04/06/2023 141 136 - 145 mmol/L Final     Potassium   Date Value Ref Range Status   04/06/2023 5.0 3.5 - 5.2 mmol/L Final     Comment:     Specimen hemolyzed.  Results may be affected.     Chloride   Date Value Ref Range Status   04/06/2023 105 98 - 107 mmol/L Final     CO2   Date Value Ref Range Status   04/06/2023 23.9 22.0 - 29.0 mmol/L Final     BUN   Date Value Ref Range Status   04/06/2023 16 6 - 20 mg/dL Final     Creatinine   Date Value Ref Range Status   04/06/2023 0.75 0.57 - 1.00 mg/dL Final     Glucose   Date Value Ref Range Status   04/06/2023 93 65 - 99 mg/dL Final     Calcium   Date Value Ref Range Status   04/06/2023 9.4 8.6 - 10.5 mg/dL Final     AST (SGOT)   Date Value Ref Range Status   04/06/2023 23 1 - 32 U/L Final     Comment:     Specimen hemolyzed.  Results may be affected.     ALT (SGPT)   Date Value Ref Range Status   04/06/2023 9 1 - 33 U/L Final     Comment:     Specimen hemolyzed.  Results may be affected.     Alkaline Phosphatase   Date Value Ref Range Status   04/06/2023 88 39 - 117 U/L Final     No results found for: APTT, INR             Imaging Results (All)     Procedure Component Value Units Date/Time    CT Abdomen Pelvis With Contrast [[id number]] Collected: 04/06/23 1558     Updated: 04/06/23 1609    Narrative:      CT ABDOMEN PELVIS W CONTRAST-     HISTORY:  Epigastric pain, taking NSAIDs.      TECHNIQUE:  CT of the abdomen and pelvis was performed following the  administration of intravenous contrast.   Radiation dose reduction  techniques were utilized, including automated exposure control and  exposure modulation based on body size.     COMPARISON:  Gallbladder ultrasound 10/12/2016     FINDINGS:     Heart size is normal. There is no pericardial effusion. Lung bases and  pleural spaces are clear.  The liver is normal in size. A  hypodense focus in the peripheral right  hepatic lobe is too small to accurately characterize. There is central  intrahepatic biliary ductal prominence.. The gallbladder is surgically  absent. The common bile duct is dilated and slightly tapers distally.  The spleen is normal in size. The proximal pancreatic duct is prominent.  The adrenal glands are within normal limits. There is a 3.9 cm inferior  right renal cyst. There is no hydronephrosis.  No pathologically enlarged abdominal or pelvic lymph nodes are  identified. The abdominal aorta is normal in caliber.   There is no bowel obstruction. The appendix is within normal limits.  There is colonic diverticulosis without CT evidence of acute  diverticulitis. The bladder is mildly distended normal limits. The  uterus is present. There is no adnexal mass.  There is a tiny fat-containing umbilical hernia. There are partially  imaged bilateral breast implants. There is multilevel degenerative disc  disease.          Impression:         1.  Status post cholecystectomy with central intrahepatic biliary ductal  prominence and common bile duct dilatation, which tapers distally and is  likely postoperative. However, the pancreatic duct is also prominent.  Further evaluation contrast-enhanced MRCP is recommended to exclude an  underlying lesion.  2.  Colonic diverticulosis.     Discussed with NAFISA Thompson at 4:06 PM.     This report was finalized on 4/6/2023 4:06 PM by Dr. Darlene Bains M.D.               Assessment:  Active Hospital Problems    Diagnosis  POA   • **Epigastric pain [R10.13]  Yes      Resolved Hospital Problems   No resolved problems to display.   leukocytosis    Plan:  Ask gi to see her  mrcp  Npo after midnight      Kathleen Freitas MD  4/6/2023  20:21 EDT

## 2023-04-07 NOTE — PLAN OF CARE
Goal Outcome Evaluation:  Plan of Care Reviewed With: patient        Progress: improving  Outcome Evaluation: VSS, VLADIMIRP completed this shift, NPO, Tylenol given for headache w/ relief, IVF infusing, daily weight, spouse at bedside

## 2023-04-07 NOTE — CONSULTS
[facility], [place], operated by [facility]  Initial Inpatient Consult Note    Referring Provider: Dr. MICHAEL Townsend    Reason for Consultation: Pain and abnormal CT scan.    Subjective     History of present illness:    57 y.o. female who was admitted for 623 with epigastric pain that would come and go but felt like she felt prior to her gallbladder surgery.  Since admission the pain has resolved.  She had some nausea yesterday.  Her weight has been stable.  She does take ibuprofen as needed. She is a non-smoker.  She occasionally drinks alcohol.  She lives in [facility] with her .  She last had an EGD when she lived in [place] with her ex-.  She does not remember the results.  She has never had a colonoscopy.  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  No rectal bleeding or melena.         She had a CT of the abdomen and pelvis yesterday that showed:  Impression:           1.  Status post cholecystectomy with central intrahepatic biliary ductal  prominence and common bile duct dilatation, which tapers distally and is  likely postoperative. However, the pancreatic duct is also prominent.  Further evaluation contrast-enhanced MRCP is recommended to exclude an  underlying lesion.  2.  Colonic diverticulosis.          Her liver function tests and lipase are normal.    Past Medical History:  Past Medical History:   Diagnosis Date   • Abdominal pain    • Bulging lumbar disc    • Dizziness of unknown cause     LAST FEW MONTHS, CHANGE IN MEDICINE   • History of anemia     WITH PREGNANCY   • Low back pain    • Migraine    • Neck pain     CERVICAL, SEES CHIROPRACTOR     Past Surgical History:  Past Surgical History:   Procedure Laterality Date   • BREAST SURGERY      AUGMENTATION   • CHOLECYSTECTOMY N/A 10/21/2016    Procedure: CHOLECYSTECTOMY LAPAROSCOPIC;  Surgeon: Luis Topete MD;  Location: [facility] OR [facility] [place];  Service:    • TONSILLECTOMY        Social History:   Social History     Tobacco Use   •  Smoking status: Former     Packs/day: 0.50     Years: 12.00     Pack years: 6.00     Types: Cigarettes     Quit date: 10/21/2016     Years since quittin.4   • Smokeless tobacco: Never   Substance Use Topics   • Alcohol use: Yes     Comment: OCCASIONAL      Family History:  Family History   Problem Relation Age of Onset   • No Known Problems Mother    • No Known Problems Father        Home Meds:  Medications Prior to Admission   Medication Sig Dispense Refill Last Dose   • Cholecalciferol (VITAMIN D3) 2000 UNITS tablet Take 1 tablet by mouth Daily.   2023   • Cyanocobalamin (B-12) 5000 MCG capsule Take 5,000 mcg by mouth Daily.   2023   • ibuprofen (ADVIL,MOTRIN) 200 MG tablet Take 1 tablet by mouth As Needed for Mild Pain.   2023   • Magnesium 250 MG tablet Take 1 tablet by mouth Daily.   2023   • VITAMIN E PO Take 800 Units by mouth Daily.   2023   • azithromycin (ZITHROMAX Z-GHAZAL) 250 MG tablet Take 2 tablets the first day, then 1 tablet daily for 4 days. 6 tablet 0    • Chlorcyclizine-Pseudoephed (STAHIST AD) 25-60 MG tablet 1 tablet 3 times a day as needed for congestion 30 tablet 0    • estradiol-norethindrone (ACTIVELLA) 1-0.5 MG per tablet Take 1 tablet by mouth Daily.      • Flaxseed, Linseed, (FLAX SEEDS PO) Take  by mouth Daily.      • guaiFENesin-codeine (GUAIFENESIN AC) 100-10 MG/5ML liquid 1-2 teaspoons PO BID (bedtime/naptime) as needed for cough 180 mL 0    • methylPREDNISolone (MEDROL, GHAZAL,) 4 MG tablet       • MULTIPLE VITAMIN PO Take  by mouth Daily.        Current Meds:   cholecalciferol, 2,000 Units, Oral, Daily  vitamin B-12, 1,000 mcg, Oral, Daily      Allergies:  No Known Allergies  Review of Systems  The following systems were reviewed and negative;  constitution, respiratory, cardiovascular, musculoskeletal and neurological     Objective     Vital Signs  Temp:  [97 °F (36.1 °C)-98 °F (36.7 °C)] 97.1 °F (36.2 °C)  Heart Rate:  [69-89] 70  Resp:  [16-18] 16  BP:  (101-129)/(66-82) 114/71  Physical Exam:  General Appearance:    Alert, cooperative, in no acute distress   Head:    Normocephalic, without obvious abnormality, atraumatic   Eyes:            Lids and lashes normal, conjunctivae and sclerae normal, no   icterus   Throat:   No oral lesions, no thrush, oral mucosa moist   Neck:   No adenopathy, supple, trachea midline, no thyromegaly, no   carotid bruit, no JVD   Lungs:     Clear to auscultation,respirations regular, even and                   unlabored    Heart:    Regular rhythm and normal rate, normal S1 and S2, no            murmur, no gallop, no rub, no click   Chest Wall:    No abnormalities observed   Abdomen:     Normal bowel sounds, no masses, no organomegaly, soft        nontender, nondistended, no guarding, no rebound                 tenderness   Rectal:     Deferred   Extremities:   no edema, no cyanosis, no redness   Skin:   No bleeding, bruising or rash   Lymph nodes:   No palpable adenopathy   Psychiatric:  Judgement and insight: normal   Orientation to person place and time: normal   Mood and affect: normal   Results Review:   I reviewed the patient's new clinical results.    Results from last 7 days   Lab Units 04/06/23  1238   WBC 10*3/mm3 11.79*   HEMOGLOBIN g/dL 13.9   HEMATOCRIT % 40.9   PLATELETS 10*3/mm3 344     Results from last 7 days   Lab Units 04/07/23  0620 04/06/23  1325   SODIUM mmol/L 141 141   POTASSIUM mmol/L 4.2 5.0   CHLORIDE mmol/L 106 105   CO2 mmol/L 29.3* 23.9   BUN mg/dL 9 16   CREATININE mg/dL 0.62 0.75   CALCIUM mg/dL 9.3 9.4   BILIRUBIN mg/dL  --  0.4   ALK PHOS U/L  --  88   ALT (SGPT) U/L  --  9   AST (SGOT) U/L  --  23   GLUCOSE mg/dL 102* 93         Lab Results   Lab Value Date/Time    LIPASE 49 04/06/2023 1238    LIPASE 27 10/12/2016 0051       Radiology:  CT Abdomen Pelvis With Contrast   Final Result       1.  Status post cholecystectomy with central intrahepatic biliary ductal   prominence and common bile duct  dilatation, which tapers distally and is   likely postoperative. However, the pancreatic duct is also prominent.   Further evaluation contrast-enhanced MRCP is recommended to exclude an   underlying lesion.   2.  Colonic diverticulosis.       Discussed with NAFISA Thompson at 4:06 PM.       This report was finalized on 4/6/2023 4:06 PM by Dr. Darlene Bains M.D.          MRI abdomen w wo contrast mrcp    (Results Pending)       Assessment & Plan   Assessment:   1.   2.  Epigastric pain that has now resolved.  She does take ibuprofen as needed.  3.  Abnormal CAT scan of the abdomen and pelvis showing central intrahepatic biliary ductal prominence common bile duct dilation and prominent pancreatic duct.  MRCP was done and results are pending.  Her liver function tests and lipase are normal.      Plan:   -I await the results of the MRCP.  -She does not want an EGD.  -I would recommend that she have a screening colonoscopy as an outpatient.  -I would recommend that she be put on Protonix 40 mg p.o. daily empirically.    I discussed the patient's findings and my recommendations with patient.         Luis Mcadams M.D.  [facility]  [address] [place] [address]  Office: [phone]

## 2023-04-07 NOTE — DISCHARGE SUMMARY
This is a 57-year-old female that presented to the hospital with epigastric abdominal pain and nausea and was noted on imaging to have concerning findings with dilated biliary system.  She had cholecystectomy in the past and it was unclear whether this represented an obstructive physiology or anatomy post surgery.  She was admitted to the hospital and started on IV fluids and an MRCP was performed.  It showed no obstructive physiology which was consistent with her presentation with no LFT elevation or bili revealed an elevation.  GI evaluated the patient and recommended PPI therapy for possible gastritis.  She is tolerating a diet her pain is resolved and she wants to go home.  She has had an EGD in the past but is never had a colonoscopy the recommendation is to follow-up in the outpatient setting in 6 weeks to discuss screening colonoscopy and EGD given her gastritis symptoms on presentation.  Given the fact that her symptoms have resolved and she is feeling better she will be discharged home in stable condition today.  The plan was discussed with the patient on the phone prior to discharge and all questions addressed and answered.  For full details of the final day please see her progress note from this day.     Your medication list      START taking these medications      Instructions Last Dose Given Next Dose Due   pantoprazole 40 MG EC tablet  Commonly known as: PROTONIX  Start taking on: April 8, 2023      Take 1 tablet by mouth Every Morning for 45 days.          CONTINUE taking these medications      Instructions Last Dose Given Next Dose Due   B-12 5000 MCG capsule      Take 5,000 mcg by mouth Daily.       estradiol-norethindrone 1-0.5 MG per tablet  Commonly known as: ACTIVELLA      Take 1 tablet by mouth Daily.       FLAX SEEDS PO      Take  by mouth Daily.       guaiFENesin-codeine 100-10 MG/5ML liquid  Commonly known as: GUAIFENESIN AC      1-2 teaspoons PO BID (bedtime/naptime) as needed for cough        Magnesium 250 MG tablet      Take 1 tablet by mouth Daily.       multivitamin tablet tablet  Commonly known as: THERAGRAN      Take  by mouth Daily.       Vitamin D3 50 MCG (2000 UT) tablet      Take 1 tablet by mouth Daily.       VITAMIN E PO      Take 800 Units by mouth Daily.          STOP taking these medications    azithromycin 250 MG tablet  Commonly known as: Zithromax Z-Norman        Chlorcyclizine-Pseudoephed 25-60 MG tablet  Commonly known as: Stahist AD        ibuprofen 200 MG tablet  Commonly known as: ADVIL,MOTRIN        methylPREDNISolone 4 MG dose pack  Commonly known as: MEDROL              Where to Get Your Medications      These medications were sent to [id number] - [facility] [address] AT [id number] - [phone] [facility] [phone]   [address], [facility] [address]    Phone: [phone]   · pantoprazole 40 MG EC tablet       Kelvin Townsend MD  Electronically signed by Kelvin Townsend MD, 04/07/23, 2:17 PM EDT.

## 2023-04-07 NOTE — NURSING NOTE
Discharge instructions provided. Patient is to follow-up with GI in 6 weeks. Rx for protonix sent to [facility] pharmacy. Questions/concerns addressed.

## 2023-04-07 NOTE — PROGRESS NOTES
Continued Stay Note  [facility]     Patient Name: Hoda Hayes  MRN: [id number]  Today's Date: 4/7/2023    Admit Date: 4/6/2023    Plan: Home no needs   Discharge Plan     Row Name 04/07/23 1146       Plan    Plan Home no needs    Plan Comments Met with patient and spouse at bedside who confirmed DC plan is to return home. Spouse will assist as needed and will provide transportation at DC. Denies needs/equipment               Discharge Codes    No documentation.                     Chasity Ribera RN

## 2023-04-07 NOTE — PROGRESS NOTES
[facility]    [phone]   LOS: 0 days     Name: Hoda Hayes  Age/Sex: 57 y.o. female  :  1965        PCP: Provider, No Known  Chief Complaint   Patient presents with   • Abdominal Pain      Subjective   She is feeling better and her symptoms have resolved.  Tolerating a diet this morning.  Seen by GI earlier today  General: No Fever or Chills, Cardiac: No Chest Pain or Palpitations, Resp: No Cough or SOA, GI: No Nausea, Vomiting, or Diarrhea and Other: No bleeding    cholecalciferol, 2,000 Units, Oral, Daily  pantoprazole, 40 mg, Oral, Q AM  vitamin B-12, 1,000 mcg, Oral, Daily      sodium chloride, 75 mL/hr, Last Rate: 75 mL/hr (23 1050)        Objective   Vital Signs  Temp:  [97 °F (36.1 °C)-98.4 °F (36.9 °C)] 98.4 °F (36.9 °C)  Heart Rate:  [68-89] 68  Resp:  [16-18] 16  BP: (100-129)/(64-77) 100/64  Body mass index is 28 kg/m².    Intake/Output Summary (Last 24 hours) at 2023 1407  Last data filed at 2023 0856  Gross per 24 hour   Intake 240 ml   Output 900 ml   Net -660 ml       Physical Exam  Vitals and nursing note reviewed.   Constitutional:       Appearance: Normal appearance.   HENT:      Head: Normocephalic and atraumatic.   Cardiovascular:      Rate and Rhythm: Normal rate and regular rhythm.   Pulmonary:      Effort: Pulmonary effort is normal. No respiratory distress.   Neurological:      General: No focal deficit present.      Mental Status: She is alert. Mental status is at baseline.           Results Review:       I reviewed the patient's new clinical results.  Results from last 7 days   Lab Units 23  0620 23  1238   WBC 10*3/mm3 5.72 11.79*   HEMOGLOBIN g/dL 11.8* 13.9   PLATELETS 10*3/mm3 287 344     Results from last 7 days   Lab Units 23  0620 23  1325   SODIUM mmol/L 141 141   POTASSIUM mmol/L 4.2 5.0   CHLORIDE mmol/L 106 105   CO2 mmol/L 29.3* 23.9   BUN mg/dL 9 16   CREATININE mg/dL 0.62 0.75   CALCIUM mg/dL 9.3  9.4   Estimated Creatinine Clearance: 107 mL/min (by C-G formula based on SCr of 0.62 mg/dL).      Assessment & Plan   Active Hospital Problems    Diagnosis  POA   • **Epigastric pain [R10.13]  Yes   • Bile duct abnormality [K83.9]  Unknown      Resolved Hospital Problems   No resolved problems to display.       PLAN  This is a 57-year-old relatively healthy female who presents to the hospital with abdominal pain and is found on imaging to have abnormal bile duct  -GI recommendations reviewed.  PPI initiated we will plan to continue on discharge  -She should follow-up outpatient for EGD and colonoscopy in the future  -Awaiting MRCP results.  If there are no noted obstruction will discuss with GI about possible discharge home this evening.  -She is tolerating a diet and pain is resolved this morning      Disposition  Expected Discharge Date and Time     Expected Discharge Date Expected Discharge Time    Apr 7, 2023              Kelvin Townsend MD  [facility]  04/07/23  14:07 EDT

## 2023-11-27 ENCOUNTER — TELEPHONE (OUTPATIENT)
Dept: GASTROENTEROLOGY | Facility: CLINIC | Age: 58
End: 2023-11-27
Payer: COMMERCIAL

## 2023-11-27 NOTE — TELEPHONE ENCOUNTER
Hub staff attempted to follow warm transfer process and was unsuccessful     Caller: Hoda Hayes    Relationship to patient: Self    Best call back number: [phone]     Patient is needing: PT CALLED TO SCHEDULE SCOPE. PT REPORTS THAT SHE WAS SEEN BY DR. ARIZMENDI IN HOSPTIAL IN APR 2023 AND HE WANTED HER TO HAVE A COLONOSCOPY. CALL BACK ANYTIME AFTER 12/1/23 IN THE AFTERNOON. OK TO [facility].

## 2023-11-28 NOTE — TELEPHONE ENCOUNTER
Mailed OA questionnaire to patient to complete for screening. Will defer for 30 days and notify referring physician if not received

## 2023-12-13 ENCOUNTER — PREP FOR SURGERY (OUTPATIENT)
Dept: OTHER | Facility: HOSPITAL | Age: 58
End: 2023-12-13
Payer: COMMERCIAL

## 2023-12-13 ENCOUNTER — TELEPHONE (OUTPATIENT)
Dept: GASTROENTEROLOGY | Facility: CLINIC | Age: 58
End: 2023-12-13
Payer: COMMERCIAL

## 2023-12-13 DIAGNOSIS — Z12.11 SCREEN FOR COLON CANCER: Primary | ICD-10-CM

## 2023-12-13 NOTE — TELEPHONE ENCOUNTER
NO PERSONAL HX OF POLYPS    NO FAMILY HX OF POLYPS    NO FAMILY HX OF COLON CA    NO ASA OR BLOOD THINNERS        LIST OF  MEDICATIONS    VITAMIN D  VITAMIN B12  MAGNESIUM  VITAMIN E            OA QUESTIONNAIRE SCANNED IN MEDIA

## 2024-01-30 ENCOUNTER — APPOINTMENT (OUTPATIENT)
Dept: WOMENS IMAGING | Facility: HOSPITAL | Age: 59
End: 2024-01-30
Payer: COMMERCIAL

## 2024-01-30 PROCEDURE — 77063 BREAST TOMOSYNTHESIS BI: CPT | Performed by: RADIOLOGY

## 2024-01-30 PROCEDURE — 77067 SCR MAMMO BI INCL CAD: CPT | Performed by: RADIOLOGY

## 2024-02-01 ENCOUNTER — TELEPHONE (OUTPATIENT)
Dept: GASTROENTEROLOGY | Facility: CLINIC | Age: 59
End: 2024-02-01
Payer: COMMERCIAL

## 2024-02-01 PROBLEM — Z12.11 SCREEN FOR COLON CANCER: Status: ACTIVE | Noted: 2023-12-13

## 2024-06-03 ENCOUNTER — TELEPHONE (OUTPATIENT)
Dept: GASTROENTEROLOGY | Facility: CLINIC | Age: 59
End: 2024-06-03
Payer: COMMERCIAL

## 2024-06-03 NOTE — TELEPHONE ENCOUNTER
Caller: Hoda Hayes    Relationship to patient: Self    Best call back number: [phone]     Chief complaint: R/S SCOPE    Type of visit: PROCEDURE    Requested date: ANYTIME AFTER 6/11/24     If rescheduling, when is the original appointment: 06/11/24

## 2024-06-03 NOTE — TELEPHONE ENCOUNTER
Caller: Hoda Hayes    Relationship to patient: Self    Best call back number: [phone]     Chief complaint: R/S SCOPE    Type of visit: PROCEDURE    Requested date: ANYTIME AFTER 6/11/24     If rescheduling, when is the original appointment: 06/11/24

## 2024-06-03 NOTE — TELEPHONE ENCOUNTER
ARABELLA FISCHER   IN SCHEDULING PT R/S TO 12/3/2024 ARRIVING AT 730AM PT VERBALIZES SHE STILL HAS PREP INFORMATION SHEET AND WILL UPDATE TO REFLECT CHANGE OK FOR HUB TO RELAY

## 2024-11-25 ENCOUNTER — TELEPHONE (OUTPATIENT)
Dept: GASTROENTEROLOGY | Facility: CLINIC | Age: 59
End: 2024-11-25
Payer: COMMERCIAL

## 2024-11-25 NOTE — TELEPHONE ENCOUNTER
Hub staff attempted to follow warm transfer process and was unsuccessful     Caller: Hoda Hayes    Relationship to patient: Self    Best call back number:  [phone]    Patient is needing: PT HAS A PROCEDURE ON TUESDAY 12/03 AND IS GOING OUT OF TOWN FOR THE HOLIDAY. PLEASE CALL IN HER PREP MEDICATION INTO HER PHARMACY, PT WOULD ALSO LIKE TO SPEAK WITH SOMEONE REGARDING PREP INSTRUCTIONS.     [id number] - [address] AT [facility] [phone] [facility] [phone] FX [[id number]]

## 2024-11-25 NOTE — TELEPHONE ENCOUNTER
ARABELLA PT PREP INFORMATION UPLOADED TO PT MY CHART PT VERBALIZES UNDERSTANDING OK FOR HUB TO RELAY

## 2024-12-02 RX ORDER — TURMERIC ROOT EXTRACT 500 MG
500 TABLET ORAL DAILY
COMMUNITY

## 2024-12-03 ENCOUNTER — HOSPITAL ENCOUNTER (OUTPATIENT)
Facility: HOSPITAL | Age: 59
Setting detail: HOSPITAL OUTPATIENT SURGERY
Discharge: HOME | End: 2024-12-03
Attending: INTERNAL MEDICINE | Admitting: INTERNAL MEDICINE
Payer: COMMERCIAL

## 2024-12-03 ENCOUNTER — ANESTHESIA EVENT (OUTPATIENT)
Dept: GASTROENTEROLOGY | Facility: HOSPITAL | Age: 59
End: 2024-12-03
Payer: COMMERCIAL

## 2024-12-03 ENCOUNTER — ANESTHESIA (OUTPATIENT)
Dept: GASTROENTEROLOGY | Facility: HOSPITAL | Age: 59
End: 2024-12-03
Payer: COMMERCIAL

## 2024-12-03 VITALS
RESPIRATION RATE: 16 BRPM | HEIGHT: 67 IN | DIASTOLIC BLOOD PRESSURE: 75 MMHG | SYSTOLIC BLOOD PRESSURE: 111 MMHG | BODY MASS INDEX: 27.8 KG/M2 | HEART RATE: 56 BPM | WEIGHT: 177.1 LBS | OXYGEN SATURATION: 97 %

## 2024-12-03 DIAGNOSIS — Z12.11 SCREEN FOR COLON CANCER: ICD-10-CM

## 2024-12-03 PROCEDURE — 25810000003 LACTATED RINGERS PER 1000 ML: Performed by: INTERNAL MEDICINE

## 2024-12-03 PROCEDURE — 45385 COLONOSCOPY W/LESION REMOVAL: CPT | Performed by: INTERNAL MEDICINE

## 2024-12-03 PROCEDURE — 25010000002 PROPOFOL 1000 MG/100ML EMULSION: Performed by: NURSE ANESTHETIST, CERTIFIED REGISTERED

## 2024-12-03 PROCEDURE — 25010000002 LIDOCAINE 2% SOLUTION: Performed by: NURSE ANESTHETIST, CERTIFIED REGISTERED

## 2024-12-03 PROCEDURE — 88305 TISSUE EXAM BY PATHOLOGIST: CPT | Performed by: INTERNAL MEDICINE

## 2024-12-03 PROCEDURE — 45380 COLONOSCOPY AND BIOPSY: CPT | Performed by: INTERNAL MEDICINE

## 2024-12-03 RX ORDER — SODIUM CHLORIDE 9 MG/ML
40 INJECTION, SOLUTION INTRAVENOUS AS NEEDED
Status: DISCONTINUED | OUTPATIENT
Start: 2024-12-03 | End: 2024-12-03 | Stop reason: HOSPADM

## 2024-12-03 RX ORDER — SODIUM CHLORIDE 0.9 % (FLUSH) 0.9 %
10 SYRINGE (ML) INJECTION EVERY 12 HOURS SCHEDULED
Status: DISCONTINUED | OUTPATIENT
Start: 2024-12-03 | End: 2024-12-03 | Stop reason: HOSPADM

## 2024-12-03 RX ORDER — SODIUM CHLORIDE, SODIUM LACTATE, POTASSIUM CHLORIDE, CALCIUM CHLORIDE 600; 310; 30; 20 MG/100ML; MG/100ML; MG/100ML; MG/100ML
30 INJECTION, SOLUTION INTRAVENOUS CONTINUOUS PRN
Status: DISCONTINUED | OUTPATIENT
Start: 2024-12-03 | End: 2024-12-03 | Stop reason: HOSPADM

## 2024-12-03 RX ORDER — LIDOCAINE HYDROCHLORIDE 20 MG/ML
INJECTION, SOLUTION INFILTRATION; PERINEURAL AS NEEDED
Status: DISCONTINUED | OUTPATIENT
Start: 2024-12-03 | End: 2024-12-03 | Stop reason: SURG

## 2024-12-03 RX ORDER — SODIUM CHLORIDE 0.9 % (FLUSH) 0.9 %
10 SYRINGE (ML) INJECTION AS NEEDED
Status: DISCONTINUED | OUTPATIENT
Start: 2024-12-03 | End: 2024-12-03 | Stop reason: HOSPADM

## 2024-12-03 RX ORDER — PROPOFOL 10 MG/ML
INJECTION, EMULSION INTRAVENOUS AS NEEDED
Status: DISCONTINUED | OUTPATIENT
Start: 2024-12-03 | End: 2024-12-03 | Stop reason: SURG

## 2024-12-03 RX ADMIN — LIDOCAINE HYDROCHLORIDE 80 MG: 20 INJECTION, SOLUTION INFILTRATION; PERINEURAL at 08:49

## 2024-12-03 RX ADMIN — PROPOFOL INJECTABLE EMULSION 150 MG: 10 INJECTION, EMULSION INTRAVENOUS at 08:49

## 2024-12-03 RX ADMIN — SODIUM CHLORIDE, SODIUM LACTATE, POTASSIUM CHLORIDE, CALCIUM CHLORIDE 30 ML/HR: 20; 30; 600; 310 INJECTION, SOLUTION INTRAVENOUS at 08:04

## 2024-12-03 RX ADMIN — PROPOFOL INJECTABLE EMULSION 160 MCG/KG/MIN: 10 INJECTION, EMULSION INTRAVENOUS at 08:51

## 2024-12-03 NOTE — ANESTHESIA POSTPROCEDURE EVALUATION
Patient: Hoda Hayes    Procedure Summary       Date: 12/03/24 Room / Location: [facility] ENDOSCOPY 8 /  [facility]    Anesthesia Start: 0843 Anesthesia Stop: 0922    Procedure: COLONOSCOPY TO CECUM AND TERMINAL ILEUM WITH COLD SNARE AND COLD BIOPSY POLYPECTOMIES Diagnosis:       Screen for colon cancer      (Screen for colon cancer [Z12.11])    Surgeons: Luis Mcadams MD Provider: Hawk Rivera MD    Anesthesia Type: MAC ASA Status: 2            Anesthesia Type: MAC    Vitals  Vitals Value Taken Time   BP 96/59 12/03/24 0920   Temp     Pulse 54 12/03/24 0928   Resp 18 12/03/24 0920   SpO2 100 % 12/03/24 0928   Vitals shown include unfiled device data.        Post Anesthesia Care and Evaluation    Patient location during evaluation: PACU  Patient participation: complete - patient participated  Level of consciousness: awake and alert  Pain management: adequate    Airway patency: patent  Anesthetic complications: No anesthetic complications    Cardiovascular status: acceptable  Respiratory status: acceptable  Hydration status: acceptable    Comments: --------------------            12/03/24 0920     --------------------   BP:       96/59      Pulse:      68       Resp:       18       SpO2:      100%     --------------------

## 2024-12-03 NOTE — DISCHARGE INSTRUCTIONS
For the next 24 hours patient needs to be with a responsible adult.    For 24 hours DO NOT drive, operate machinery, appliances, drink alcohol, make important decisions or sign legal documents.    Start with a light or bland diet and advance to regular diet as tolerated.    Follow recommendations on procedure report provided by your doctor.    Call Dr Mcadams for problems [phone]    Problems may include but not limited to: large amounts of bleeding, trouble breathing, repeated vomiting, severe unrelieved pain, fever or chills.

## 2024-12-03 NOTE — H&P
"[facility]  Pre Procedure History & Physical    Chief Complaint:   Time for my colonoscopy    Subjective     HPI:   59 y.o. female who is here for a screening colonoscopy.    Past Medical History:   Past Medical History:   Diagnosis Date    Abdominal pain     Bulging lumbar disc     Dizziness of unknown cause     LAST FEW MONTHS, CHANGE IN MEDICINE    History of anemia     WITH PREGNANCY    History of COVID-19     Low back pain     Migraine     Neck pain     CERVICAL, SEES CHIROPRACTOR       Family History:  Family History   Problem Relation Age of Onset    No Known Problems Mother     No Known Problems Father     Davey Hyperthermia Neg Hx        Social History:   reports that she quit smoking about 8 years ago. Her smoking use included cigarettes. She started smoking about 20 years ago. She has a 6 pack-year smoking history. She has never used smokeless tobacco. She reports current alcohol use. She reports that she does not use drugs.    Medications:   Medications Prior to Admission   Medication Sig Dispense Refill Last Dose/Taking    Cholecalciferol (VITAMIN D3) 2000 UNITS tablet Take 1 tablet by mouth Daily.   Past Week    Cyanocobalamin (B-12) 5000 MCG capsule Take 5,000 mcg by mouth Daily.   Past Week    Magnesium 250 MG tablet Take 1 tablet by mouth Daily.   Past Week    Turmeric 500 MG tablet Take 500 mg by mouth Daily. HELD   Past Week    VITAMIN E PO Take 800 Units by mouth Daily. HELD   Past Week    guaiFENesin-codeine (GUAIFENESIN AC) 100-10 MG/5ML liquid 1-2 teaspoons PO BID (bedtime/naptime) as needed for cough 180 mL 0        Allergies:  Patient has no known allergies.    ROS:    Pertinent items are noted in HPI     Objective     Blood pressure 114/68, pulse 66, resp. rate 12, height 170.2 cm (67\"), weight 80.3 kg (177 lb 1.6 oz), last menstrual period 10/01/2016, SpO2 99%.    Physical Exam   Constitutional: Pt is oriented to person, place, and time and well-developed, " well-nourished, and in no distress.   HENT:   Mouth/Throat: Oropharynx is clear and moist.   Neck: Normal range of motion. Neck supple.   Cardiovascular: Normal rate, regular rhythm and normal heart sounds.    Pulmonary/Chest: Effort normal and breath sounds normal. No respiratory distress. No  wheezes.   Abdominal: Soft. Bowel sounds are normal.   Skin: Skin is warm and dry.   Psychiatric: Mood, memory, affect and judgment normal.     Assessment & Plan     Diagnosis:  59 y.o. female who is here for a screening colonoscopy.    Anticipated Surgical Procedure:  Colonoscopy    The risks, benefits, and alternatives of this procedure have been discussed with the patient or the responsible party- the patient understands and agrees to proceed.    Luis Mcadams M.D.

## 2024-12-03 NOTE — ANESTHESIA PREPROCEDURE EVALUATION
Anesthesia Evaluation     Patient summary reviewed and Nursing notes reviewed                Airway   Mallampati: II  TM distance: <3 FB  No difficulty expected  Dental      Pulmonary    (+) a smoker Former,  Cardiovascular - negative cardio ROS    ECG reviewed  Rhythm: regular  Rate: normal        Neuro/Psych  (+) headaches, dizziness/light headedness  GI/Hepatic/Renal/Endo    (+) obesity    Musculoskeletal     (+) neck pain  Abdominal    Substance History   (+) alcohol use     OB/GYN negative ob/gyn ROS         Other   arthritis,                 Anesthesia Plan    ASA 2     MAC     intravenous induction     Anesthetic plan, risks, benefits, and alternatives have been provided, discussed and informed consent has been obtained with: patient.    CODE STATUS:

## 2024-12-04 LAB
CYTO UR: NORMAL
LAB AP CASE REPORT: NORMAL
PATH REPORT.FINAL DX SPEC: NORMAL
PATH REPORT.GROSS SPEC: NORMAL

## 2025-01-03 ENCOUNTER — TELEPHONE (OUTPATIENT)
Dept: GASTROENTEROLOGY | Facility: CLINIC | Age: 60
End: 2025-01-03
Payer: COMMERCIAL

## 2025-01-03 NOTE — TELEPHONE ENCOUNTER
----- Message from Luis Mcadams sent at 12/31/2024  1:28 PM EST -----  12/31/24       Please tell her that the colon polyps that were removed were not cancerous but 1 was precancerous.  I recommend that she have a repeat colonoscopy in 5 years.  Please send a copy of this report to her PCP.  Thx. kjh

## 2025-01-03 NOTE — TELEPHONE ENCOUNTER
Patient notified of results and recommendations and verbalized understanding    Hm and cs recall placed for 12/3/29    No PCP on file

## (undated) DEVICE — LN SMPL CO2 SHTRM SD STREAM W/M LUER

## (undated) DEVICE — SNAR POLYP CAPTIVATOR RND STFF 2.4 240CM 10MM 1P/U

## (undated) DEVICE — SINGLE-USE BIOPSY FORCEPS: Brand: RADIAL JAW 4

## (undated) DEVICE — CANN O2 ETCO2 FITS ALL CONN CO2 SMPL A/ 7IN DISP LF

## (undated) DEVICE — THE SINGLE USE ETRAP – POLYP TRAP IS USED FOR SUCTION RETRIEVAL OF ENDOSCOPICALLY REMOVED POLYPS.: Brand: ETRAP

## (undated) DEVICE — SENSR O2 OXIMAX FNGR A/ 18IN NONSTR

## (undated) DEVICE — ADAPT CLN BIOGUARD AIR/H2O DISP

## (undated) DEVICE — KT ORCA ORCAPOD DISP STRL

## (undated) DEVICE — TUBING, SUCTION, 1/4" X 10', STRAIGHT: Brand: MEDLINE